# Patient Record
Sex: MALE | Race: WHITE | NOT HISPANIC OR LATINO | Employment: FULL TIME | URBAN - METROPOLITAN AREA
[De-identification: names, ages, dates, MRNs, and addresses within clinical notes are randomized per-mention and may not be internally consistent; named-entity substitution may affect disease eponyms.]

---

## 2024-08-26 ENCOUNTER — OFFICE VISIT (OUTPATIENT)
Dept: FAMILY MEDICINE | Facility: CLINIC | Age: 39
End: 2024-08-26

## 2024-08-26 VITALS
HEART RATE: 113 BPM | SYSTOLIC BLOOD PRESSURE: 124 MMHG | TEMPERATURE: 98.9 F | DIASTOLIC BLOOD PRESSURE: 78 MMHG | HEIGHT: 72 IN | BODY MASS INDEX: 25.6 KG/M2 | WEIGHT: 189 LBS | OXYGEN SATURATION: 97 %

## 2024-08-26 DIAGNOSIS — M25.50 POLYARTHRALGIA: ICD-10-CM

## 2024-08-26 DIAGNOSIS — Z13.29 SCREENING FOR ENDOCRINE, METABOLIC AND IMMUNITY DISORDER: ICD-10-CM

## 2024-08-26 DIAGNOSIS — R76.8 RHEUMATOID FACTOR POSITIVE: ICD-10-CM

## 2024-08-26 DIAGNOSIS — Z13.0 SCREENING FOR ENDOCRINE, METABOLIC AND IMMUNITY DISORDER: ICD-10-CM

## 2024-08-26 DIAGNOSIS — Z13.228 SCREENING FOR ENDOCRINE, METABOLIC AND IMMUNITY DISORDER: ICD-10-CM

## 2024-08-26 DIAGNOSIS — Z71.89 ACP (ADVANCE CARE PLANNING): ICD-10-CM

## 2024-08-26 DIAGNOSIS — Z11.59 ENCOUNTER FOR HEPATITIS C SCREENING TEST FOR LOW RISK PATIENT: ICD-10-CM

## 2024-08-26 DIAGNOSIS — Z13.220 SCREENING FOR LIPOID DISORDERS: ICD-10-CM

## 2024-08-26 DIAGNOSIS — Z23 NEED FOR VACCINATION: ICD-10-CM

## 2024-08-26 DIAGNOSIS — Z00.00 ROUTINE GENERAL MEDICAL EXAMINATION AT A HEALTH CARE FACILITY: Primary | ICD-10-CM

## 2024-08-26 DIAGNOSIS — Z11.4 SCREENING FOR HIV (HUMAN IMMUNODEFICIENCY VIRUS): ICD-10-CM

## 2024-08-26 LAB
BUN SERPL-MCNC: 10 MG/DL (ref 7–25)
BUN/CREATININE RATIO: 9 (ref 6–32)
CALCIUM SERPL-MCNC: 10.1 MG/DL (ref 8.6–10.3)
CHLORIDE SERPLBLD-SCNC: 99.2 MMOL/L (ref 98–110)
CHOLEST SERPL-MCNC: 223 MG/DL (ref 0–199)
CHOLEST/HDLC SERPL: 4 {RATIO} (ref 0–5)
CO2 SERPL-SCNC: 28.6 MMOL/L (ref 20–32)
CREAT SERPL-MCNC: 1.15 MG/DL (ref 0.6–1.3)
ERYTHROCYTE [SEDIMENTATION RATE] IN BLOOD: 2 MM/HR (ref 0–20)
GLUCOSE SERPL-MCNC: 95 MG/DL (ref 60–99)
HDLC SERPL-MCNC: 61 MG/DL (ref 40–150)
LDLC SERPL CALC-MCNC: 129 MG/DL
POTASSIUM SERPL-SCNC: 4.1 MMOL/L (ref 3.5–5.3)
SODIUM SERPL-SCNC: 138.2 MMOL/L (ref 135–146)
TRIGL SERPL-MCNC: 166 MG/DL (ref 0–149)
URIC ACID (BFP): 6.4 (ref 2.5–7)

## 2024-08-26 PROCEDURE — 86140 C-REACTIVE PROTEIN: CPT | Mod: 90 | Performed by: PHYSICIAN ASSISTANT

## 2024-08-26 PROCEDURE — 84550 ASSAY OF BLOOD/URIC ACID: CPT | Performed by: PHYSICIAN ASSISTANT

## 2024-08-26 PROCEDURE — 80061 LIPID PANEL: CPT | Performed by: PHYSICIAN ASSISTANT

## 2024-08-26 PROCEDURE — 84439 ASSAY OF FREE THYROXINE: CPT | Mod: 90 | Performed by: PHYSICIAN ASSISTANT

## 2024-08-26 PROCEDURE — 85651 RBC SED RATE NONAUTOMATED: CPT | Performed by: PHYSICIAN ASSISTANT

## 2024-08-26 PROCEDURE — 99214 OFFICE O/P EST MOD 30 MIN: CPT | Mod: 25 | Performed by: PHYSICIAN ASSISTANT

## 2024-08-26 PROCEDURE — 86618 LYME DISEASE ANTIBODY: CPT | Mod: 90 | Performed by: PHYSICIAN ASSISTANT

## 2024-08-26 PROCEDURE — 84443 ASSAY THYROID STIM HORMONE: CPT | Mod: 90 | Performed by: PHYSICIAN ASSISTANT

## 2024-08-26 PROCEDURE — 80048 BASIC METABOLIC PNL TOTAL CA: CPT | Performed by: PHYSICIAN ASSISTANT

## 2024-08-26 PROCEDURE — 99385 PREV VISIT NEW AGE 18-39: CPT | Mod: 25 | Performed by: PHYSICIAN ASSISTANT

## 2024-08-26 PROCEDURE — 86803 HEPATITIS C AB TEST: CPT | Mod: 90 | Performed by: PHYSICIAN ASSISTANT

## 2024-08-26 PROCEDURE — 86038 ANTINUCLEAR ANTIBODIES: CPT | Mod: 90 | Performed by: PHYSICIAN ASSISTANT

## 2024-08-26 PROCEDURE — 36415 COLL VENOUS BLD VENIPUNCTURE: CPT | Performed by: PHYSICIAN ASSISTANT

## 2024-08-26 PROCEDURE — 90715 TDAP VACCINE 7 YRS/> IM: CPT | Performed by: PHYSICIAN ASSISTANT

## 2024-08-26 PROCEDURE — 86431 RHEUMATOID FACTOR QUANT: CPT | Mod: 90 | Performed by: PHYSICIAN ASSISTANT

## 2024-08-26 PROCEDURE — 87389 HIV-1 AG W/HIV-1&-2 AB AG IA: CPT | Mod: 90 | Performed by: PHYSICIAN ASSISTANT

## 2024-08-26 PROCEDURE — 90471 IMMUNIZATION ADMIN: CPT | Performed by: PHYSICIAN ASSISTANT

## 2024-08-26 NOTE — NURSING NOTE
Chief Complaint   Patient presents with    New Patient     Pt is a new patient and is here for a fasting PX   Pt was in the ER 7-27  for joint pain  -was put on Prednisone but is finished with medication still having pain on and off would like to discuss with provider        Pre-visit Screening:  Immunizations:  not up to date - do today   Colonoscopy:  never had done no family hx   Mammogram: na  Asthma Action Test/Plan:  na  PHQ9:  na  GAD7:  na  Questioned patient about current smoking habits Pt. Pt chews tobacco   Ok to leave detailed message on voice mail for today's visit only yes , phone # [phone]

## 2024-08-26 NOTE — PROGRESS NOTES
Preventive Care Visit  [facility], PFUNMI Porras PA-C, Family Medicine  Aug 26, 2024      SUBJECTIVE:   Cali is a 39 year old, presenting for the following:  New Patient (Pt is a new patient and is here for a fasting PX /Pt was in the ER 7-27  for joint pain  -was put on Prednisone but is finished with medication still having pain on and off would like to discuss with provider )      HPI    Diet - could be better, fast food 1-2x/week, the rest are cooked at home  Exercise - job is physical (), walking 2 dogs  Sleep - hard to fall asleep but good when asleep, 7-8hrs/night  BM - normal, 2x/day  Vitamin Use - None  Dentist - has an appt in 2 days, hasn't seen regularly   Eye Doctor - regularly, once every 2 years, has contacts/glasses, 2 months ago started to get extremely dry eyes in AM and lubricating eyedrops    Fasting ~14 hours.     ~2 months ago, was lying down on right side at work and noticed burning discomfort/pain. Felt his side and noticed a soft lump by right ribcage. Discussed lipoma vs cyst and reassured regarding likely benign nature. Recommend monitoring for changes and letting us know if there are changes.     ~1.5 months ago, had sudden onset of symmetric joint pain, swelling, and inflammation throughout his body (wrists, fingers, knees, shoulders, hips, jaw). His symptoms spread and moved around his body over a ~2 week period. He presented to the ER and was prescribed prednisone x7 days, completed with benefit. After completion, his symptoms returned and persisted. This weekend, the pain was severe enough where he could not walk. Over the past couple of days, his symptoms have significantly improved and just mildly persisting today in his left knee and bilateral shoulders. He has been using Advil with benefit. States his symptoms are worse in the AM and after dinner but are better throughout the day. Denies episodes with previous symptoms and family history of  "autoimmune disease and arthritis. Has tried cutting out red meat for 1 week without changes in his symptoms. Has been bitten by ticks multiple times through his life.      Social History     Tobacco Use    Smoking status: Never    Smokeless tobacco: Current     Types: Chew   Substance Use Topics    Alcohol use: Not Currently     Last PSA: No results found for: \"PSA\"    Reviewed orders with patient. Reviewed health maintenance and updated orders accordingly - Yes    Reviewed and updated as needed this visit by clinical staff   Tobacco  Allergies  Meds   Med Hx  Surg Hx  Fam Hx  Soc Hx        Reviewed and updated as needed this visit by Provider   Tobacco   Meds   Med Hx  Surg Hx  Fam Hx  Soc Hx Sexual Activity        History reviewed. No pertinent past medical history.   History reviewed. No pertinent surgical history.  Review of Systems    Review of Systems  Constitutional, neuro, ENT, endocrine, pulmonary, cardiac, gastrointestinal, genitourinary, musculoskeletal, integument and psychiatric systems are negative, except as otherwise noted.    OBJECTIVE:   /78 (BP Location: Left arm, Patient Position: Sitting, Cuff Size: Adult Large)   Pulse 113   Temp 98.9  F (37.2  C) (Oral)   Ht 1.816 m (5' 11.5\")   Wt 85.7 kg (189 lb)   SpO2 97%   BMI 25.99 kg/m     Estimated body mass index is 25.99 kg/m  as calculated from the following:    Height as of this encounter: 1.816 m (5' 11.5\").    Weight as of this encounter: 85.7 kg (189 lb).  Physical Exam  GENERAL: alert and no distress  EYES: Eyes grossly normal to inspection, PERRL and conjunctivae and sclerae normal  HENT: ear canals and TM's normal, nose and mouth without ulcers or lesions  NECK: no adenopathy, no asymmetry, masses, or scars  RESP: lungs clear to auscultation - no rales, rhonchi or wheezes  CV: regular rate and rhythm, normal S1 S2, no S3 or S4, no murmur, click or rub, no peripheral edema  ABDOMEN: soft, nontender, no " hepatosplenomegaly, no masses and bowel sounds normal  MS: no gross musculoskeletal defects noted, no edema, small, soft mass on lateral aspect of R ribcage   NEURO: Normal strength and tone, mentation intact and speech normal  PSYCH: mentation appears normal, affect normal/bright    Diagnostic Test Results:  Labs reviewed in Epic  No results found for this or any previous visit (from the past 24 hour(s)).    ASSESSMENT/PLAN:   Routine general medical examination at a health care facility  - HIV 1/2 Agn Jenifer 4th Gen w Reflex ([facility])  - Basic Metabolic Panel ([facility])  - Lipid Panel ([facility])  - HEPATITIS C ANTIBODY ([facility])  - TDAP VACCINE (Adacel, Boostrix)  [[id number]]    Polyarthralgia - persisting diffuse joint pain and inflammation, improved today. Suspect autoimmune etiology based on sudden diffuse onset and improvement in symptoms throughout the day. We will work up autoimmune etiology and rule out other causes as below and then contact pt with next steps based on results.   - PETEY Scrn Rflx to Titer and Ptrn ([facility])  - RHEUMATOID FACTOR ([facility])  - Lymes Antibodies Total ([facility])  - ESR WESTERGREN ([facility])  - C-Reactive Protein CRP ([facility])  - Uric Acid ([facility])  - TSH ([facility])  - T4 FREE ([facility])  - Continue Advil prn.   - If symptoms do not improve or worsen, contact us and we will send referral to Rheumatology-may need to rule out AS    Need for vaccination  - TDAP VACCINE (Adacel, Boostrix)  [[id number]]    Screening for lipoid disorders  - Lipid Panel ([facility])    Screening for HIV (human immunodeficiency virus)  - HIV 1/2 Agn Jenifer 4th Gen w Reflex ([facility])    Encounter for hepatitis C screening test for low risk patient  - HEPATITIS C ANTIBODY ([facility])    Screening for endocrine, metabolic and immunity disorder  - Basic Metabolic Panel ([facility])    ACP (advance care planning)      Patient has been advised of split billing requirements and indicates understanding: Yes      Counseling  Reviewed preventive health counseling, as reflected in  patient instructions       Dentist       Immunizations  Vaccinated for: TDAP           Consider Hep C screening for all patients one time for ages 18-79 years       HIV screeninx in teen years, 1x in adult years, and at intervals if high risk        He reports that he has never smoked. His smokeless tobacco use includes chew.            Signed Electronically by: Bony Porras PA-C

## 2024-08-28 LAB
ANA PATTERN - QUEST: ABNORMAL
ANA SCREEN - QUEST: POSITIVE
ANTINUCLEAR ANTIBODIES - QUEST: ABNORMAL TITER
CRP SERPL-MCNC: <3 MG/L (ref 0–0.8)
HCV AB - QUEST: NORMAL
HIV 1/2 AGN ABY 4TH GEN WITH REFLEX: NORMAL
LYME SCREEN IGG AND IGM: <0.9 INDEX
RHEUMATOID FACT SER NEPH-ACNC: 22 IU/ML (ref 0–20)
T4, FREE, NON-DIALYSIS - QUEST: 1.3 NG/DL (ref 0.8–1.8)
TSH SERPL-ACNC: 1.01 MIU/L (ref 0.4–4.5)

## 2024-09-20 ENCOUNTER — TRANSFERRED RECORDS (OUTPATIENT)
Dept: FAMILY MEDICINE | Facility: CLINIC | Age: 39
End: 2024-09-20

## 2024-10-22 ENCOUNTER — TRANSFERRED RECORDS (OUTPATIENT)
Dept: FAMILY MEDICINE | Facility: CLINIC | Age: 39
End: 2024-10-22

## 2025-01-15 ENCOUNTER — TRANSFERRED RECORDS (OUTPATIENT)
Dept: FAMILY MEDICINE | Facility: CLINIC | Age: 40
End: 2025-01-15

## 2025-08-12 ENCOUNTER — TRANSFERRED RECORDS (OUTPATIENT)
Dept: FAMILY MEDICINE | Facility: CLINIC | Age: 40
End: 2025-08-12